# Patient Record
Sex: MALE | Race: WHITE
[De-identification: names, ages, dates, MRNs, and addresses within clinical notes are randomized per-mention and may not be internally consistent; named-entity substitution may affect disease eponyms.]

---

## 2017-09-06 ENCOUNTER — HOSPITAL ENCOUNTER (OUTPATIENT)
Dept: HOSPITAL 75 - RAD | Age: 67
End: 2017-09-06
Attending: NURSE PRACTITIONER
Payer: MEDICARE

## 2017-09-06 DIAGNOSIS — E66.9: ICD-10-CM

## 2017-09-06 DIAGNOSIS — R06.00: ICD-10-CM

## 2017-09-06 DIAGNOSIS — G47.33: ICD-10-CM

## 2017-09-06 DIAGNOSIS — R91.1: Primary | ICD-10-CM

## 2017-09-06 PROCEDURE — 94726 PLETHYSMOGRAPHY LUNG VOLUMES: CPT

## 2017-09-06 PROCEDURE — 94640 AIRWAY INHALATION TREATMENT: CPT

## 2017-09-06 PROCEDURE — 94060 EVALUATION OF WHEEZING: CPT

## 2017-09-06 PROCEDURE — 94729 DIFFUSING CAPACITY: CPT

## 2017-09-06 PROCEDURE — 71250 CT THORAX DX C-: CPT

## 2017-09-06 NOTE — DIAGNOSTIC IMAGING REPORT
PROCEDURE: CT chest without contrast.



TECHNIQUE: Multiple contiguous axial images were obtained through

the chest without the use of intravenous contrast.



INDICATION:  Followup lung nodules.



COMPARISON: The previous CT chest exam 6/1/2015 noted small

parenchymal abnormalities involving the right upper lobe and left

lung base. 



FINDINGS: On this exam, the linear area of increased density in

the right upper lobe seen previously is again evident and no

different. The 7 mm nodule near the left hemidiaphragm seen

previously is also unchanged. The stability of these findings

over a greater than two-year period would indicate that they are

most likely benign. A followup chest exam in one year would be

recommended for continued evaluation however. The overall

appearance of the chest has not changed significantly otherwise.

The lungs are generally clear. There is no sign of failure,

pneumonia or pleural effusion to suggest an acute abnormality.

The heart is stable in size. Coronary artery calcifications are

evident. The left-sided pacemaker seen previously is again

evident and unchanged in position.



The aorta is not abnormally dilated. There is no mediastinal or

hilar adenopathy. 



The thyroid gland where visualized is unremarkable. 



There does seem to be generalized prominence of the

fibroglandular tissue in both breasts. This could be related to

gynecomastia.



The sections through the upper abdomen failed to show any sign of

an acute abnormality. There is a 1.4 cm nodule associated with

the left adrenal gland. This is no different than on the 2015

exam.



The bone windows show no sign of a fracture or of a destructive

lesion. 



IMPRESSION:

1. The linear density in the right upper lobe and the small

nodule in the left lung base seen previously appear stable since

2015. Most likely these are benign. A followup CT chest exam in

one year would be recommended for continued evaluation.

2. There is no acute cardiopulmonary abnormality noted. 



Dictated by: 



  Dictated on workstation # [id number]

## 2017-09-28 ENCOUNTER — HOSPITAL ENCOUNTER (OUTPATIENT)
Dept: HOSPITAL 75 - LAB | Age: 67
End: 2017-09-28
Attending: INTERNAL MEDICINE
Payer: MEDICARE

## 2017-09-28 DIAGNOSIS — G47.34: ICD-10-CM

## 2017-09-28 DIAGNOSIS — R06.00: ICD-10-CM

## 2017-09-28 DIAGNOSIS — J44.9: Primary | ICD-10-CM

## 2017-09-28 LAB
BASOPHILS # BLD AUTO: 0 10^3/UL (ref 0–0.1)
BASOPHILS NFR BLD AUTO: 0 % (ref 0–10)
EOSINOPHIL # BLD AUTO: 0.2 10^3/UL (ref 0–0.3)
EOSINOPHIL NFR BLD AUTO: 2 % (ref 0–10)
ERYTHROCYTE [DISTWIDTH] IN BLOOD BY AUTOMATED COUNT: 13 % (ref 10–14.5)
LYMPHOCYTES # BLD AUTO: 2.7 X 10^3 (ref 1–4)
LYMPHOCYTES NFR BLD AUTO: 29 % (ref 12–44)
MCH RBC QN AUTO: 31 PG (ref 25–34)
MCHC RBC AUTO-ENTMCNC: 34 G/DL (ref 32–36)
MCV RBC AUTO: 89 FL (ref 80–99)
MONOCYTES # BLD AUTO: 1.1 X 10^3 (ref 0–1)
MONOCYTES NFR BLD AUTO: 12 % (ref 0–12)
NEUTROPHILS # BLD AUTO: 5.2 X 10^3 (ref 1.8–7.8)
NEUTROPHILS NFR BLD AUTO: 56 % (ref 42–75)
PLATELET # BLD: 218 10^3/UL (ref 130–400)
PMV BLD AUTO: 10.3 FL (ref 7.4–10.4)
RBC # BLD AUTO: 4.96 10^6/UL (ref 4.35–5.85)
WBC # BLD AUTO: 9.3 10^3/UL (ref 4.3–11)

## 2017-09-28 PROCEDURE — 36415 COLL VENOUS BLD VENIPUNCTURE: CPT

## 2017-09-28 PROCEDURE — 85025 COMPLETE CBC W/AUTO DIFF WBC: CPT

## 2017-10-10 ENCOUNTER — HOSPITAL ENCOUNTER (OUTPATIENT)
Dept: HOSPITAL 75 - SLEEP | Age: 67
LOS: 1 days | Discharge: HOME | End: 2017-10-11
Attending: NURSE PRACTITIONER
Payer: MEDICARE

## 2017-10-10 DIAGNOSIS — G47.33: Primary | ICD-10-CM

## 2017-10-10 PROCEDURE — 95810 POLYSOM 6/> YRS 4/> PARAM: CPT

## 2018-03-21 ENCOUNTER — HOSPITAL ENCOUNTER (OUTPATIENT)
Dept: HOSPITAL 75 - RAD | Age: 68
End: 2018-03-21
Attending: NURSE PRACTITIONER
Payer: MEDICARE

## 2018-03-21 DIAGNOSIS — R91.1: ICD-10-CM

## 2018-03-21 DIAGNOSIS — E66.9: ICD-10-CM

## 2018-03-21 DIAGNOSIS — Z85.46: ICD-10-CM

## 2018-03-21 DIAGNOSIS — J44.9: Primary | ICD-10-CM

## 2018-03-21 DIAGNOSIS — G47.33: ICD-10-CM

## 2018-03-21 PROCEDURE — 71250 CT THORAX DX C-: CPT

## 2018-03-21 NOTE — DIAGNOSTIC IMAGING REPORT
PROCEDURE: CT chest without contrast.



TECHNIQUE: Multiple contiguous axial images were obtained through

the chest without the use of intravenous contrast.



INDICATION:  Right-sided chest soreness. Congestion x3 weeks.

History of prostate cancer.



Correlation study: CT chest 09/06/2017



FINDINGS: 



The heart size is normal with presence of a mild to moderate

coronary artery calcification. A left-sided multichamber

pacemaker is present. No significant pericardial effusion. A few

shotty but nonpathologically enlarged mediastinal lymph nodes are

present. EG junction unremarkable.



Lung fields demonstrate no consolidating infiltrate. No pleural

effusion. A linear density about the right upper lobe appears

unchanged. Additionally, 6-7 mm nodule left lower lobe along the

diaphragm unchanged as well. Unchanged slight asymmetric

elevation of the right hemidiaphragm.



Osseous structures including the right ribs are unremarkable.



Visualized portion upper abdomen demonstrate what appears to be

likely large amount of retained gastric contents. Gallbladder is

contracted likely owing to recent meal ingestion. Nodularity of

the left adrenal gland appears unchanged but is partially

visualized on followup.



There is diffuse increased density in the fibroglandular tissue

within the breast may be reflective of rather pronounced

gynecomastia.



Impression:

1. Negative for acute abnormality of the chest on noncontrast

imaging.



Dictated by: 



  Dictated on workstation # [id number]

## 2019-04-18 ENCOUNTER — HOSPITAL ENCOUNTER (OUTPATIENT)
Dept: HOSPITAL 75 - RAD | Age: 69
End: 2019-04-18
Attending: NURSE PRACTITIONER
Payer: MEDICARE

## 2019-04-18 DIAGNOSIS — R91.1: ICD-10-CM

## 2019-04-18 DIAGNOSIS — Z95.0: ICD-10-CM

## 2019-04-18 DIAGNOSIS — J44.9: Primary | ICD-10-CM

## 2019-04-18 PROCEDURE — 71250 CT THORAX DX C-: CPT

## 2019-04-18 NOTE — DIAGNOSTIC IMAGING REPORT
PROCEDURE: CT chest without contrast.



TECHNIQUE: Multiple contiguous axial images were obtained through

the chest without the use of intravenous contrast. Auto Exposure

Controls were utilized during the CT exam to meet ALARA standards

for radiation dose reduction. 



INDICATION:  Lung nodule and COPD. Study is performed for

followup.



Correlation is made with prior exam from 03/21/2018.



FINDINGS: No definite axillary lymphadenopathy is seen. No

definite mediastinal or hilar lymphadenopathy is detected. Left

chest wall cardiac pacemaker remains in place. There is no

pericardial or pleural fluid detected. Previously noted left

lower lobe nodule 7 mm in size is stable when compared with prior

exam. No new pulmonary nodule or mass is seen. Upper abdomen is

unremarkable.



IMPRESSION: Stable left lower lobe nodule. This has shown

approximately 4 years of stability and no further followup is

indicated. Remainder of lung fields are clear. No acute feature

is detected.



Dictated by: 



  Dictated on workstation # [id number]

## 2019-06-19 ENCOUNTER — HOSPITAL ENCOUNTER (OUTPATIENT)
Dept: HOSPITAL 75 - RT | Age: 69
End: 2019-06-19
Attending: NURSE PRACTITIONER
Payer: MEDICARE

## 2019-06-19 DIAGNOSIS — G47.33: ICD-10-CM

## 2019-06-19 DIAGNOSIS — R09.02: Primary | ICD-10-CM

## 2019-06-19 DIAGNOSIS — Z99.89: ICD-10-CM

## 2019-06-19 DIAGNOSIS — R91.1: ICD-10-CM

## 2019-06-19 DIAGNOSIS — J44.9: ICD-10-CM

## 2019-06-19 DIAGNOSIS — R06.00: ICD-10-CM

## 2019-06-19 PROCEDURE — 94726 PLETHYSMOGRAPHY LUNG VOLUMES: CPT

## 2019-06-19 PROCEDURE — 94060 EVALUATION OF WHEEZING: CPT

## 2019-06-19 PROCEDURE — 94729 DIFFUSING CAPACITY: CPT

## 2020-06-16 ENCOUNTER — HOSPITAL ENCOUNTER (OUTPATIENT)
Dept: HOSPITAL 75 - RAD | Age: 70
End: 2020-06-16
Attending: NURSE PRACTITIONER
Payer: MEDICARE

## 2020-06-16 DIAGNOSIS — J44.9: ICD-10-CM

## 2020-06-16 DIAGNOSIS — R91.1: Primary | ICD-10-CM

## 2020-06-16 DIAGNOSIS — R09.02: ICD-10-CM

## 2020-06-16 PROCEDURE — 71250 CT THORAX DX C-: CPT

## 2020-06-16 NOTE — DIAGNOSTIC IMAGING REPORT
PROCEDURE: CT chest without contrast.



TECHNIQUE: Multiple contiguous axial images were obtained through

the chest without the use of intravenous contrast. Auto Exposure

Controls were utilized during the CT exam to meet ALARA standards

for radiation dose reduction. 



INDICATION: Dyspnea.



COMPARISON: April 18, 2019 and March 21, 2018.



FINDINGS: Pacer device is present with battery pack overlying the

left chest. No significant adenopathy of the chest. No aneurysmal

dilatation of thoracic aorta. The heart is within normal limits

in size. No pericardial effusion. No pleural effusion. Stable

mild elevation of the right hemidiaphragm. The trachea is patent.

No pneumothorax. Stable 6 mm pleural-based left lower lobe

pulmonary nodule is again identified and unchanged from the prior

exams. The lungs are otherwise clear. No new pulmonary nodule.

Visualized upper abdomen is unremarkable. No acute osseous

abnormalities with mild scattered osseous degenerative changes.



IMPRESSION: Stable examination without acute abnormality.



Stable 6 mm left lower lobe pleural-based pulmonary nodule. Given

several years of stability, this is benign.



Dictated by: 



  Dictated on workstation # [id number]

## 2020-09-09 ENCOUNTER — HOSPITAL ENCOUNTER (OUTPATIENT)
Dept: HOSPITAL 75 - RAD | Age: 70
End: 2020-09-09
Attending: NURSE PRACTITIONER
Payer: MEDICARE

## 2020-09-09 DIAGNOSIS — J44.9: Primary | ICD-10-CM

## 2020-09-09 PROCEDURE — 71046 X-RAY EXAM CHEST 2 VIEWS: CPT

## 2020-09-09 NOTE — DIAGNOSTIC IMAGING REPORT
INDICATION: COPD



COMPARISON: CT chest dated 06/16/2020



FINDINGS: Frontal and lateral views of the chest demonstrate

normal heart size and pulmonary vascularity. The lungs are clear.

There are no signs of infiltrate, pleural effusions or

pneumothoraces. The visualized osseous structures show no acute

abnormalities. Left-sided dual-lead pacemaker is noted.



IMPRESSION: 

1. No acute process. No signs of infiltrates, effusions or

pneumothoraces.



Dictated by: 



  Dictated on workstation # [id number]